# Patient Record
Sex: FEMALE | URBAN - METROPOLITAN AREA
[De-identification: names, ages, dates, MRNs, and addresses within clinical notes are randomized per-mention and may not be internally consistent; named-entity substitution may affect disease eponyms.]

---

## 2023-08-02 ENCOUNTER — TELEPHONE (OUTPATIENT)
Age: 38
End: 2023-08-02

## 2023-08-02 ENCOUNTER — NURSE ONLY (OUTPATIENT)
Age: 38
End: 2023-08-02

## 2023-08-02 NOTE — TELEPHONE ENCOUNTER
The [facility]  was used. Discussed colpo with pt. See notes in the [address] nurse visit for 8/2/23.

## 2023-09-06 ENCOUNTER — HOSPITAL ENCOUNTER (OUTPATIENT)
Facility: HOSPITAL | Age: 38
Setting detail: SPECIMEN
Discharge: HOME | End: 2023-09-09

## 2023-09-06 ENCOUNTER — OFFICE VISIT (OUTPATIENT)
Age: 38
End: 2023-09-06

## 2023-09-06 VITALS — DIASTOLIC BLOOD PRESSURE: 70 MMHG | WEIGHT: 132 LBS | SYSTOLIC BLOOD PRESSURE: 116 MMHG

## 2023-09-06 DIAGNOSIS — R87.613 HGSIL ON CYTOLOGIC SMEAR OF CERVIX: Primary | ICD-10-CM

## 2023-09-06 LAB
HCG, PREGNANCY, URINE, POC: NEGATIVE
VALID INTERNAL CONTROL, POC: YES

## 2023-09-06 RX ORDER — ETONOGESTREL 68 MG/1
68 IMPLANT SUBCUTANEOUS ONCE
COMMUNITY

## 2023-09-06 NOTE — PROGRESS NOTES
Abnormal Pap Problem Visit    Talita Hurtado is a 41 y.o.  presenting for problem visit for discussion and management of an abnormal pap test.  She has a history of regular cervical cancer screening. She declines ever having an abnormal pap test before. She is a lifetime non-smoker. She has had 2 previous vaginal deliveries, is done with childbearing. She currently has a Nexplanon in for birth control (placed in 2023). She has irregular bleeding with her Nexplanon in.    Ob/Gyn Hx:    - 2 vaginal deliveries. LMP- absent in flow due to delivery. Menses- see above. Contraception- Nexplanon  SA- yes- male. Past Medical History:   Diagnosis Date    Abnormal Pap smear of cervix     HGSIL       History reviewed. No pertinent surgical history. No family history on file. Social History     Socioeconomic History    Marital status: Unknown     Spouse name: Not on file    Number of children: Not on file    Years of education: Not on file    Highest education level: Not on file   Occupational History    Not on file   Tobacco Use    Smoking status: Never    Smokeless tobacco: Never   Vaping Use    Vaping Use: Never used   Substance and Sexual Activity    Alcohol use: Never    Drug use: Never    Sexual activity: Yes     Partners: Male     Birth control/protection: Implant   Other Topics Concern    Not on file   Social History Narrative    Not on file     Social Determinants of Health     Financial Resource Strain: Not on file   Food Insecurity: Not on file   Transportation Needs: Not on file   Physical Activity: Not on file   Stress: Not on file   Social Connections: Not on file   Intimate Partner Violence: Not on file   Housing Stability: Not on file       Current Outpatient Medications   Medication Sig Dispense Refill    etonogestrel (NEXPLANON) 68 MG implant 68 mg by Subdermal route once       No current facility-administered medications for this visit.        No Known Allergies    Review of

## 2023-09-12 ENCOUNTER — TELEPHONE (OUTPATIENT)
Age: 38
End: 2023-09-12

## 2023-09-12 NOTE — TELEPHONE ENCOUNTER
Called Brittney to review results of recent colposcopy. Given findings of CIN3 on 6:00 biopsy, recommend LEEP procedure. Discussed recommendation with patient. She is in agreement. Will schedule for outpatient LEEP. Discussed recommendation for tylenol/motrin after procedure and abstaining from intercourse for at least 2 weeks after procedure to give cervix time to heal.    We discussed the risks of surgery including the risks of bleeding, infection, deep vein thrombosis, and surgical injuries to internal organs including but not limited to the cervix and vagina. The patient understands the risks; any and all questions were answered to the patient's satisfaction.       Casa Guzman MD

## 2023-09-15 ENCOUNTER — TELEPHONE (OUTPATIENT)
Age: 38
End: 2023-09-15

## 2023-09-15 NOTE — TELEPHONE ENCOUNTER
Calling patient to follow up post colposcopy. Patient will need LEEP and has been scheduled for 10/20/2023. Needs to discuss [address]. Patient did not answer, left message to call back. Christiane Mcgill RN      Colposcopy progress notes, pathology report sent to referring provider as per continuity of care via [employer] fax/routing.  Christiane Mcgill RN

## 2023-09-20 ENCOUNTER — PREP FOR PROCEDURE (OUTPATIENT)
Facility: HOSPITAL | Age: 38
End: 2023-09-20

## 2023-09-20 DIAGNOSIS — D06.9 CIN III (CERVICAL INTRAEPITHELIAL NEOPLASIA III): Primary | ICD-10-CM

## 2023-09-27 ENCOUNTER — TELEPHONE (OUTPATIENT)
Age: 38
End: 2023-09-27

## 2023-09-27 NOTE — TELEPHONE ENCOUNTER
Returned patient's phone call no answer. Left message that I will try to call back tomorrow. I will be contacting [employer] and have them route bill to our office it usually takes longer than >30 days to process.

## 2023-09-27 NOTE — TELEPHONE ENCOUNTER
Patient receive a bill for $372.20 for a colposcopy that was done on 9/6/23 with [facility] was told it will be at no charge.      Phone

## 2023-10-18 ENCOUNTER — ANESTHESIA EVENT (OUTPATIENT)
Facility: HOSPITAL | Age: 38
End: 2023-10-18

## 2023-10-19 RX ORDER — PROCHLORPERAZINE EDISYLATE 5 MG/ML
5 INJECTION INTRAMUSCULAR; INTRAVENOUS
Status: CANCELLED | OUTPATIENT
Start: 2023-10-19 | End: 2023-10-20

## 2023-10-19 RX ORDER — SODIUM CHLORIDE 0.9 % (FLUSH) 0.9 %
5-40 SYRINGE (ML) INJECTION PRN
Status: CANCELLED | OUTPATIENT
Start: 2023-10-19

## 2023-10-19 RX ORDER — HYDROMORPHONE HYDROCHLORIDE 1 MG/ML
0.5 INJECTION, SOLUTION INTRAMUSCULAR; INTRAVENOUS; SUBCUTANEOUS EVERY 5 MIN PRN
Status: CANCELLED | OUTPATIENT
Start: 2023-10-19

## 2023-10-19 RX ORDER — OXYCODONE HYDROCHLORIDE 5 MG/1
5 TABLET ORAL
Status: CANCELLED | OUTPATIENT
Start: 2023-10-19 | End: 2023-10-20

## 2023-10-19 RX ORDER — SODIUM CHLORIDE 0.9 % (FLUSH) 0.9 %
5-40 SYRINGE (ML) INJECTION EVERY 12 HOURS SCHEDULED
Status: CANCELLED | OUTPATIENT
Start: 2023-10-19

## 2023-10-19 RX ORDER — ONDANSETRON 2 MG/ML
4 INJECTION INTRAMUSCULAR; INTRAVENOUS
Status: CANCELLED | OUTPATIENT
Start: 2023-10-19 | End: 2023-10-20

## 2023-10-19 RX ORDER — FENTANYL CITRATE 50 UG/ML
25 INJECTION, SOLUTION INTRAMUSCULAR; INTRAVENOUS EVERY 5 MIN PRN
Status: CANCELLED | OUTPATIENT
Start: 2023-10-19

## 2023-10-19 RX ORDER — HYDRALAZINE HYDROCHLORIDE 20 MG/ML
10 INJECTION INTRAMUSCULAR; INTRAVENOUS
Status: CANCELLED | OUTPATIENT
Start: 2023-10-19

## 2023-10-19 RX ORDER — SODIUM CHLORIDE 9 MG/ML
INJECTION, SOLUTION INTRAVENOUS PRN
Status: CANCELLED | OUTPATIENT
Start: 2023-10-19

## 2023-10-20 ENCOUNTER — ANESTHESIA (OUTPATIENT)
Facility: HOSPITAL | Age: 38
End: 2023-10-20

## 2023-10-20 ENCOUNTER — TELEPHONE (OUTPATIENT)
Age: 38
End: 2023-10-20

## 2023-10-20 ENCOUNTER — HOSPITAL ENCOUNTER (OUTPATIENT)
Facility: HOSPITAL | Age: 38
Setting detail: OUTPATIENT SURGERY
Discharge: HOME | End: 2023-10-20
Attending: OBSTETRICS & GYNECOLOGY | Admitting: OBSTETRICS & GYNECOLOGY

## 2023-10-20 VITALS
SYSTOLIC BLOOD PRESSURE: 126 MMHG | RESPIRATION RATE: 16 BRPM | WEIGHT: 132.06 LBS | OXYGEN SATURATION: 100 % | BODY MASS INDEX: 22.55 KG/M2 | TEMPERATURE: 98.4 F | HEIGHT: 64 IN | DIASTOLIC BLOOD PRESSURE: 79 MMHG | HEART RATE: 58 BPM

## 2023-10-20 DIAGNOSIS — D06.9 CIN III (CERVICAL INTRAEPITHELIAL NEOPLASIA III): ICD-10-CM

## 2023-10-20 LAB — HCG UR QL: NEGATIVE

## 2023-10-20 PROCEDURE — 81025 URINE PREGNANCY TEST: CPT

## 2023-10-20 PROCEDURE — 3600000003 HC SURGERY LEVEL 3 BASE: Performed by: OBSTETRICS & GYNECOLOGY

## 2023-10-20 PROCEDURE — 2500000003 HC RX 250 WO HCPCS: Performed by: OBSTETRICS & GYNECOLOGY

## 2023-10-20 PROCEDURE — 6360000002 HC RX W HCPCS: Performed by: NURSE ANESTHETIST, CERTIFIED REGISTERED

## 2023-10-20 PROCEDURE — 7100000001 HC PACU RECOVERY - ADDTL 15 MIN: Performed by: OBSTETRICS & GYNECOLOGY

## 2023-10-20 PROCEDURE — 7100000011 HC PHASE II RECOVERY - ADDTL 15 MIN: Performed by: OBSTETRICS & GYNECOLOGY

## 2023-10-20 PROCEDURE — 3700000000 HC ANESTHESIA ATTENDED CARE: Performed by: OBSTETRICS & GYNECOLOGY

## 2023-10-20 PROCEDURE — 2500000003 HC RX 250 WO HCPCS: Performed by: NURSE ANESTHETIST, CERTIFIED REGISTERED

## 2023-10-20 PROCEDURE — 7100000010 HC PHASE II RECOVERY - FIRST 15 MIN: Performed by: OBSTETRICS & GYNECOLOGY

## 2023-10-20 PROCEDURE — 3700000001 HC ADD 15 MINUTES (ANESTHESIA): Performed by: OBSTETRICS & GYNECOLOGY

## 2023-10-20 PROCEDURE — 7100000000 HC PACU RECOVERY - FIRST 15 MIN: Performed by: OBSTETRICS & GYNECOLOGY

## 2023-10-20 PROCEDURE — 2709999900 HC NON-CHARGEABLE SUPPLY: Performed by: OBSTETRICS & GYNECOLOGY

## 2023-10-20 PROCEDURE — 6370000000 HC RX 637 (ALT 250 FOR IP): Performed by: OBSTETRICS & GYNECOLOGY

## 2023-10-20 PROCEDURE — 2580000003 HC RX 258: Performed by: NURSE ANESTHETIST, CERTIFIED REGISTERED

## 2023-10-20 PROCEDURE — 3600000013 HC SURGERY LEVEL 3 ADDTL 15MIN: Performed by: OBSTETRICS & GYNECOLOGY

## 2023-10-20 PROCEDURE — 88307 TISSUE EXAM BY PATHOLOGIST: CPT

## 2023-10-20 PROCEDURE — 57522 CONIZATION OF CERVIX: CPT | Performed by: OBSTETRICS & GYNECOLOGY

## 2023-10-20 RX ORDER — SODIUM CHLORIDE 0.9 % (FLUSH) 0.9 %
5-40 SYRINGE (ML) INJECTION PRN
Status: DISCONTINUED | OUTPATIENT
Start: 2023-10-20 | End: 2023-10-20 | Stop reason: HOSPADM

## 2023-10-20 RX ORDER — KETAMINE HCL IN NACL, ISO-OSM 100MG/10ML
SYRINGE (ML) INJECTION PRN
Status: DISCONTINUED | OUTPATIENT
Start: 2023-10-20 | End: 2023-10-20 | Stop reason: SDUPTHER

## 2023-10-20 RX ORDER — ONDANSETRON 2 MG/ML
INJECTION INTRAMUSCULAR; INTRAVENOUS PRN
Status: DISCONTINUED | OUTPATIENT
Start: 2023-10-20 | End: 2023-10-20 | Stop reason: SDUPTHER

## 2023-10-20 RX ORDER — SODIUM CHLORIDE, SODIUM LACTATE, POTASSIUM CHLORIDE, CALCIUM CHLORIDE 600; 310; 30; 20 MG/100ML; MG/100ML; MG/100ML; MG/100ML
INJECTION, SOLUTION INTRAVENOUS CONTINUOUS PRN
Status: DISCONTINUED | OUTPATIENT
Start: 2023-10-20 | End: 2023-10-20 | Stop reason: SDUPTHER

## 2023-10-20 RX ORDER — LUGOLS 0.4 G/G
LIQUID TOPICAL PRN
Status: DISCONTINUED | OUTPATIENT
Start: 2023-10-20 | End: 2023-10-20 | Stop reason: HOSPADM

## 2023-10-20 RX ORDER — FENTANYL CITRATE 50 UG/ML
INJECTION, SOLUTION INTRAMUSCULAR; INTRAVENOUS PRN
Status: DISCONTINUED | OUTPATIENT
Start: 2023-10-20 | End: 2023-10-20 | Stop reason: SDUPTHER

## 2023-10-20 RX ORDER — MIDAZOLAM HYDROCHLORIDE 1 MG/ML
INJECTION INTRAMUSCULAR; INTRAVENOUS PRN
Status: DISCONTINUED | OUTPATIENT
Start: 2023-10-20 | End: 2023-10-20 | Stop reason: SDUPTHER

## 2023-10-20 RX ORDER — DEXAMETHASONE SODIUM PHOSPHATE 4 MG/ML
INJECTION, SOLUTION INTRA-ARTICULAR; INTRALESIONAL; INTRAMUSCULAR; INTRAVENOUS; SOFT TISSUE PRN
Status: DISCONTINUED | OUTPATIENT
Start: 2023-10-20 | End: 2023-10-20 | Stop reason: SDUPTHER

## 2023-10-20 RX ORDER — FERRIC SUBSULFATE 20-22G/100
SOLUTION, NON-ORAL MISCELLANEOUS PRN
Status: DISCONTINUED | OUTPATIENT
Start: 2023-10-20 | End: 2023-10-20 | Stop reason: HOSPADM

## 2023-10-20 RX ORDER — ACETIC ACID 5 %
LIQUID (ML) MISCELLANEOUS PRN
Status: DISCONTINUED | OUTPATIENT
Start: 2023-10-20 | End: 2023-10-20 | Stop reason: HOSPADM

## 2023-10-20 RX ORDER — SODIUM CHLORIDE 0.9 % (FLUSH) 0.9 %
5-40 SYRINGE (ML) INJECTION EVERY 12 HOURS SCHEDULED
Status: DISCONTINUED | OUTPATIENT
Start: 2023-10-20 | End: 2023-10-20 | Stop reason: HOSPADM

## 2023-10-20 RX ORDER — FENTANYL CITRATE 50 UG/ML
100 INJECTION, SOLUTION INTRAMUSCULAR; INTRAVENOUS
Status: DISCONTINUED | OUTPATIENT
Start: 2023-10-20 | End: 2023-10-20 | Stop reason: HOSPADM

## 2023-10-20 RX ORDER — ACETAMINOPHEN 500 MG
1000 TABLET ORAL ONCE
Status: DISCONTINUED | OUTPATIENT
Start: 2023-10-20 | End: 2023-10-20 | Stop reason: HOSPADM

## 2023-10-20 RX ORDER — SODIUM CHLORIDE 9 MG/ML
INJECTION, SOLUTION INTRAVENOUS PRN
Status: DISCONTINUED | OUTPATIENT
Start: 2023-10-20 | End: 2023-10-20 | Stop reason: HOSPADM

## 2023-10-20 RX ORDER — PROPOFOL 10 MG/ML
INJECTION, EMULSION INTRAVENOUS CONTINUOUS PRN
Status: DISCONTINUED | OUTPATIENT
Start: 2023-10-20 | End: 2023-10-20 | Stop reason: SDUPTHER

## 2023-10-20 RX ORDER — LIDOCAINE HYDROCHLORIDE AND EPINEPHRINE 10; 10 MG/ML; UG/ML
INJECTION, SOLUTION INFILTRATION; PERINEURAL PRN
Status: DISCONTINUED | OUTPATIENT
Start: 2023-10-20 | End: 2023-10-20 | Stop reason: HOSPADM

## 2023-10-20 RX ORDER — KETOROLAC TROMETHAMINE 30 MG/ML
INJECTION, SOLUTION INTRAMUSCULAR; INTRAVENOUS PRN
Status: DISCONTINUED | OUTPATIENT
Start: 2023-10-20 | End: 2023-10-20 | Stop reason: SDUPTHER

## 2023-10-20 RX ORDER — SODIUM CHLORIDE, SODIUM LACTATE, POTASSIUM CHLORIDE, CALCIUM CHLORIDE 600; 310; 30; 20 MG/100ML; MG/100ML; MG/100ML; MG/100ML
INJECTION, SOLUTION INTRAVENOUS CONTINUOUS
Status: DISCONTINUED | OUTPATIENT
Start: 2023-10-20 | End: 2023-10-20 | Stop reason: HOSPADM

## 2023-10-20 RX ORDER — LIDOCAINE HYDROCHLORIDE 10 MG/ML
1 INJECTION, SOLUTION EPIDURAL; INFILTRATION; INTRACAUDAL; PERINEURAL
Status: DISCONTINUED | OUTPATIENT
Start: 2023-10-20 | End: 2023-10-20 | Stop reason: HOSPADM

## 2023-10-20 RX ORDER — MIDAZOLAM HYDROCHLORIDE 2 MG/2ML
2 INJECTION, SOLUTION INTRAMUSCULAR; INTRAVENOUS
Status: DISCONTINUED | OUTPATIENT
Start: 2023-10-20 | End: 2023-10-20 | Stop reason: HOSPADM

## 2023-10-20 RX ADMIN — MIDAZOLAM HYDROCHLORIDE 3 MG: 1 INJECTION, SOLUTION INTRAMUSCULAR; INTRAVENOUS at 13:05

## 2023-10-20 RX ADMIN — DEXAMETHASONE SODIUM PHOSPHATE 4 MG: 4 INJECTION INTRA-ARTICULAR; INTRALESIONAL; INTRAMUSCULAR; INTRAVENOUS; SOFT TISSUE at 13:29

## 2023-10-20 RX ADMIN — PROPOFOL 125 MCG/KG/MIN: 10 INJECTION, EMULSION INTRAVENOUS at 13:15

## 2023-10-20 RX ADMIN — SODIUM CHLORIDE, POTASSIUM CHLORIDE, SODIUM LACTATE AND CALCIUM CHLORIDE: 600; 310; 30; 20 INJECTION, SOLUTION INTRAVENOUS at 12:50

## 2023-10-20 RX ADMIN — ONDANSETRON 4 MG: 2 INJECTION INTRAMUSCULAR; INTRAVENOUS at 13:29

## 2023-10-20 RX ADMIN — MIDAZOLAM HYDROCHLORIDE 2 MG: 1 INJECTION, SOLUTION INTRAMUSCULAR; INTRAVENOUS at 13:10

## 2023-10-20 RX ADMIN — FENTANYL CITRATE 25 MCG: 50 INJECTION, SOLUTION INTRAMUSCULAR; INTRAVENOUS at 13:32

## 2023-10-20 RX ADMIN — KETOROLAC TROMETHAMINE 30 MG: 30 INJECTION, SOLUTION INTRAMUSCULAR; INTRAVENOUS at 13:46

## 2023-10-20 RX ADMIN — Medication 25 MG: at 13:19

## 2023-10-20 ASSESSMENT — PAIN - FUNCTIONAL ASSESSMENT: PAIN_FUNCTIONAL_ASSESSMENT: NONE - DENIES PAIN

## 2023-10-20 ASSESSMENT — PAIN SCALES - GENERAL
PAINLEVEL_OUTOF10: 0
PAINLEVEL_OUTOF10: 0

## 2023-10-20 NOTE — FLOWSHEET NOTE
10/20/23 1357   Handoff   Communication Given Transfer Handoff   Handoff Given To PACU   Handoff Received From Carlo Rucker RN   Handoff Communication Face to Face   End of Shift Check Performed N/A

## 2023-10-20 NOTE — TELEPHONE ENCOUNTER
services use to conduct telephone call. Patient is checked in on Dr. Ena Hutchins schedule for a LEEP?  states patient is here in office. Per JAH Arreguin having trouble locating patient in office. Left message to call office.

## 2023-10-20 NOTE — DISCHARGE INSTRUCTIONS
After Care Instructions For Leep Cone Biopsy      1. Typically following this procedure, there is minimal pain. However, you may experience some dull crampy lower abdominal discomfort which can be relieved by Tylenol or Motrin. If severe pain develops, notify the office at [phone]. 2. It is normal to experience some spotting or even light bleeding. This discharge may occur for several days following the procedure. If bleeding exceeds soaking a pad every 2 hours or passing blood clots, you should contact the office. 3. Avoid placing anything in your vagina. Do not douche or use tampons. Sleeping [place] may be uncomfortable and may create bleeding and/or infection. These restrictions will be lifted after your physician has seen you on your post-op visit. 4. You may take showers. Avoid using a tub bath, swimming pool or hot tub until after your check-up. 5. Please notify the office if you have a fever which is a temperature above 100.4. You should also notify the office if you develop severe abdominal pain, heavy vaginal bleeding or a foul smelling vaginal discharge. 6. It is difficult to predict when your next menstrual period will occur as your body has undergone a major stress. Your period should regulate itself within the first 6 weeks post-op. 7. Please do not do strenuous exercise for the first 2 weeks following the procedure. You can then resume all usual activity. Your follow-up appointment should be in 4-6 weeks. Please contact the office at  [phone] if an appointment has not already been set up. Your first Pap smear post-operatively will be in 3-6 months. Please have a thorough discussion with the doctor about how often your follow-up needs to be, depending upon the disease we have treated you for.

## 2023-10-20 NOTE — OP NOTE
Operative Note      Patient: Ck Trinidad  YOB: 1985  MRN: [id number]    Date of Procedure: 10/20/2023    Pre-Op Diagnosis Codes:     * KOKO III (cervical intraepithelial neoplasia III) [D06.9]    Post-Op Diagnosis: Same       Procedure(s):  LOOP ELECTROSURGICAL EXCISION PROCEDURE    Surgeon(s):  Casa Guzman MD    Assistant:   * No surgical staff found *    Anesthesia: Monitor Anesthesia Care    Estimated Blood Loss (mL): Minimal    Complications: None    Specimens:   ID Type Source Tests Collected by Time Destination   1 : cervical leep biopsy. stitch at 12 o'clock Tissue Cervix SURGICAL PATHOLOGY Casa Guzman MD 10/20/2023 1343        Implants:  * No implants in log *      Drains: * No LDAs found *    Detailed Description of Procedure: Indications for procedure  Ck Trinidad is a 41 y.o. [id number] with CIN3/HSIL on biopsy. Given these results, options were reviewed and she was taken to the operating room for a LEEP procedure. Findings    Exam under anesthesia revealed a small, mobile, anteverted uterus. There were no adnexal masses detected. The cervix appeared pink and smooth, and no lesions or masses were noted on cervical exam.     Procedure Details   After a timeout correctly identified the patient, the procedure, and the members of the operative team, anesthesia was administered. She was placed in the lithotomy position using candycane stirrups. The patient was then prepped and draped in the usual sterile fashion. A coated speculum was placed in the vagina. Colposcopic examination was not performed due to no working colposcope in the OR. The cervix was then infiltrated with approximately 10cc  of 1% lidocaine with epinephrine in a circumferential fashion. After adequate blanching, Lugol stain was then applied to the cervix for confirmation of the lesion. Sidewall retractors were used to more appropriately visualize the cervix and protect the vaginal walls.  A 10-millimeter

## 2023-10-20 NOTE — DISCHARGE SUMMARY
Gynecology Discharge Summary     Patient ID:  Noe Kim  [id number]  46 y.o.  1985    Admit date: 10/20/2023    Discharge date: 10/20/2023     Admission Diagnoses: There is no problem list on file for this patient. Discharge Diagnoses: No discharge information exists for this patient. There is no problem list on file for this patient. Procedures for this admission: Procedure(s):  [facility] Course:    Disposition: Home or self care    Discharged Condition: stable            Patient Instructions:   Current Discharge Medication List        CONTINUE these medications which have NOT CHANGED    Details   etonogestrel (NEXPLANON) 68 MG implant 68 mg by Subdermal route once           Activity: activity as tolerated  Diet: regular diet  Wound Care: keep wound clean and dry    Follow-up with  in 6 weeks.     Signed:  Monique Stone MD  10/20/2023  1:57 PM

## 2023-10-20 NOTE — H&P
Gynecology History and Physical    Name: Emmy Billingsley MRN: [id number] SSN: [id number]    YOB: 1985  Age: 41 y.o. Sex: female       Subjective:      Chief complaint:  Cervical dysplasia    Alison Mckinley is a 41 y.o.  female with a history of HSIL pap and HSIL on colposcopy- biopsy at 6:00. She has no history of prior abnormal pap tests. She is admitted for Procedure(s) (LRB):  LOOP ELECTROSURGICAL EXCISION PROCEDURE, COLPOSCOPY (N/A). The current method of family planning is Nexplanon. OB History          2    Para   2    Term   2       0    AB   0    Living   2         SAB   0    IAB   0    Ectopic        Molar        Multiple        Live Births              Obstetric Comments   2 vaginal deliveries. One boy and one girl. Past Medical History:   Diagnosis Date    Abnormal Pap smear of cervix     HGSIL     No past surgical history on file. Social History     Occupational History    Not on file   Tobacco Use    Smoking status: Never    Smokeless tobacco: Never   Vaping Use    Vaping Use: Never used   Substance and Sexual Activity    Alcohol use: Never    Drug use: Never    Sexual activity: Yes     Partners: Male     Birth control/protection: Implant     No family history on file. No Known Allergies  Prior to Admission medications    Medication Sig Start Date End Date Taking? Authorizing Provider   etonogestrel (NEXPLANON) 68 MG implant 68 mg by Subdermal route once    Provider, MD Yobany        Review of Systems:  A comprehensive review of systems was negative except for that written in the History of Present Illness. Objective: There were no vitals filed for this visit. Physical Exam:  Deferred    Assessment:     Cervical dysplasia with colposcopy demonstrating    1. Cervix, 6:00, biopsy: High-grade squamous intraepithelial lesion (KOKO 3)     2. Cervix, 12:00, biopsy: Mild chronic cervicitis. No dysplasia identified.  Transformation

## 2023-10-20 NOTE — PERIOP NOTE
I have reviewed discharge instructions with the  spouse-Jose. The  spouse-Jose verbalized understanding. All questions addressed at this time. A paper copy of these instructions have been given to the patient to take home.

## 2023-10-24 ENCOUNTER — TELEPHONE (OUTPATIENT)
Age: 38
End: 2023-10-24

## 2023-10-24 NOTE — TELEPHONE ENCOUNTER
Called patient to notify results of LEEP biopsy. LEEP showed HSIL with negative margins. Recommend repeat pap test in 1 year.     Hany Guthrie MD

## 2023-10-26 NOTE — TELEPHONE ENCOUNTER
Called patient, ID x2- Patient does not meet qualification for [facility] Medicaid due to no SS#. Pt had LEEP on 10/20/2023- she will be applying for [employer]. Application started and patient will get documents needed to attach to application and will meet with this nurse at [facility] on 11/16/2023 to drop off completed application at [facility] financial counselor drop box. Patient thankful for assistance. Kaylen Holguin RN      Patient was aware of LEEP results and recommendations, discussed importance of getting her pap smear in 1 year and she will follow up  with the [facility] to get this schedule. she is aware that [employer]   Only covered her colposcopy and biopsy services- pt has received bills for this but discussed that she is not responsible for them. I have submitted an inquiry to transfer charges to [facility] on 10/26/2023.

## 2023-11-01 ENCOUNTER — NURSE ONLY (OUTPATIENT)
Age: 38
End: 2023-11-01

## 2023-11-01 NOTE — PROGRESS NOTES
Encounter completed in re: finalizing patient's referral in to the [facility] Every [address] program.   Patient referred to us for colposcopy by the [facility]. Pt enrolled in [facility] [employer] program and was sent to have colposcopy on 9/6/2023- resulted HSIL. The provider was routed the office note and pathology  report  and pt was told of the results.  Patient has been billed in [facility], RN Respiratory failure with hypercapnia, unspecified chronicity  01/29/2017    Active  Arturo Uribe Respiratory failure with hypercapnia, unspecified chronicity  01/29/2017    Active  Arturo Uribe Respiratory failure with hypercapnia, unspecified chronicity  01/29/2017    Active  Arturo Uribe

## 2023-11-16 ENCOUNTER — NURSE ONLY (OUTPATIENT)
Age: 38
End: 2023-11-16

## 2023-12-04 NOTE — PROGRESS NOTES
Met in person with patient and drop off completed [employer]. Provided patient a copy of submitted application and attached documents.  Patient aware that it can take up to 90 days to process Wilson Dimas RN

## 2024-01-04 ENCOUNTER — NURSE ONLY (OUTPATIENT)
Age: 39
End: 2024-01-04

## 2024-01-04 NOTE — PROGRESS NOTES
Patient applied for [employer] financial assistance on 11/16/2023 as was denied due to required additional documents. Patient met with me and provided additional documentation. Financial assistance applciation and supporting documents were sent to [facility] financial counselor Moose Thurman via encrypted email, patient is aware that she will get a letter with a decision in the mail . Ene Benjamin RN

## 2024-01-19 ENCOUNTER — TELEPHONE (OUTPATIENT)
Age: 39
End: 2024-01-19

## 2024-01-19 NOTE — TELEPHONE ENCOUNTER
Received call from patient ID x2 on 1/16/2024- that she had received a letter that her financial assistance was approved and they have received a letter. Instructed to make copies of approval letter and to send copy with bills from [facility]. Patient verbalized understanding. All questions answered. Ene Benjamin RN    01/19/24 noted that patient had made a copay on her alda. On 9/6/2023- this appt. Was covered by [facility]. Asking [employer] for a refund. Ene Benjamin RN

## 2024-06-11 ENCOUNTER — APPOINTMENT (OUTPATIENT)
Facility: HOSPITAL | Age: 39
End: 2024-06-11

## 2024-06-11 ENCOUNTER — HOSPITAL ENCOUNTER (EMERGENCY)
Facility: HOSPITAL | Age: 39
Discharge: HOME | End: 2024-06-11
Attending: SPECIALIST

## 2024-06-11 VITALS
OXYGEN SATURATION: 96 % | RESPIRATION RATE: 15 BRPM | TEMPERATURE: 98.7 F | DIASTOLIC BLOOD PRESSURE: 89 MMHG | HEART RATE: 66 BPM | BODY MASS INDEX: 28.13 KG/M2 | WEIGHT: 134 LBS | HEIGHT: 58 IN | SYSTOLIC BLOOD PRESSURE: 146 MMHG

## 2024-06-11 DIAGNOSIS — S16.1XXA ACUTE STRAIN OF NECK MUSCLE, INITIAL ENCOUNTER: ICD-10-CM

## 2024-06-11 DIAGNOSIS — R07.89 CHEST WALL TENDERNESS: ICD-10-CM

## 2024-06-11 DIAGNOSIS — M25.561 ACUTE PAIN OF RIGHT KNEE: ICD-10-CM

## 2024-06-11 DIAGNOSIS — M54.50 ACUTE BILATERAL LOW BACK PAIN WITHOUT SCIATICA: ICD-10-CM
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
## 2024-06-11 LAB
EKG ATRIAL RATE: 64 BPM
EKG DIAGNOSIS: NORMAL
EKG P AXIS: 70 DEGREES
EKG P-R INTERVAL: 124 MS
EKG Q-T INTERVAL: 410 MS
EKG QRS DURATION: 90 MS
EKG QTC CALCULATION (BAZETT): 422 MS
EKG R AXIS: 69 DEGREES
EKG T AXIS: 40 DEGREES
EKG VENTRICULAR RATE: 64 BPM

## 2024-06-11 PROCEDURE — 6370000000 HC RX 637 (ALT 250 FOR IP): Performed by: NURSE PRACTITIONER

## 2024-06-11 PROCEDURE — 93005 ELECTROCARDIOGRAM TRACING: CPT | Performed by: STUDENT IN AN ORGANIZED HEALTH CARE EDUCATION/TRAINING PROGRAM

## 2024-06-11 PROCEDURE — 72070 X-RAY EXAM THORAC SPINE 2VWS: CPT

## 2024-06-11 PROCEDURE — 73560 X-RAY EXAM OF KNEE 1 OR 2: CPT

## 2024-06-11 PROCEDURE — 71046 X-RAY EXAM CHEST 2 VIEWS: CPT

## 2024-06-11 PROCEDURE — 72110 X-RAY EXAM L-2 SPINE 4/>VWS: CPT

## 2024-06-11 PROCEDURE — 72050 X-RAY EXAM NECK SPINE 4/5VWS: CPT

## 2024-06-11 PROCEDURE — 93010 ELECTROCARDIOGRAM REPORT: CPT | Performed by: INTERNAL MEDICINE

## 2024-06-11 PROCEDURE — 99284 EMERGENCY DEPT VISIT MOD MDM: CPT

## 2024-06-11 RX ORDER — IBUPROFEN 400 MG/1
800 TABLET ORAL
Status: COMPLETED | OUTPATIENT
Start: 2024-06-11 | End: 2024-06-11

## 2024-06-11 RX ORDER — NAPROXEN 500 MG/1
500 TABLET ORAL 2 TIMES DAILY
Qty: 60 TABLET | Refills: 0 | Status: SHIPPED | OUTPATIENT
Start: 2024-06-11

## 2024-06-11 RX ORDER — LIDOCAINE 4 G/G
1 PATCH TOPICAL
Status: DISCONTINUED | OUTPATIENT
Start: 2024-06-11 | End: 2024-06-11 | Stop reason: HOSPADM

## 2024-06-11 RX ORDER — METHOCARBAMOL 750 MG/1
750 TABLET, FILM COATED ORAL 4 TIMES DAILY
Qty: 40 TABLET | Refills: 0 | Status: SHIPPED | OUTPATIENT
Start: 2024-06-11 | End: 2024-06-21

## 2024-06-11 RX ORDER — LIDOCAINE 50 MG/G
1 PATCH TOPICAL DAILY
Qty: 10 PATCH | Refills: 0 | Status: SHIPPED | OUTPATIENT
Start: 2024-06-11 | End: 2024-06-21

## 2024-06-11 RX ORDER — METHOCARBAMOL 500 MG/1
750 TABLET, FILM COATED ORAL
Status: COMPLETED | OUTPATIENT
Start: 2024-06-11 | End: 2024-06-11

## 2024-06-11 RX ORDER — LIDOCAINE 4 G/G
1 PATCH TOPICAL
Status: DISCONTINUED | OUTPATIENT
Start: 2024-06-11 | End: 2024-06-11

## 2024-06-11 RX ADMIN — METHOCARBAMOL TABLETS 750 MG: 500 TABLET, COATED ORAL at 13:32

## 2024-06-11 RX ADMIN — IBUPROFEN 800 MG: 400 TABLET, FILM COATED ORAL at 13:33

## 2024-06-11 ASSESSMENT — PAIN - FUNCTIONAL ASSESSMENT
PAIN_FUNCTIONAL_ASSESSMENT: PREVENTS OR INTERFERES SOME ACTIVE ACTIVITIES AND ADLS
PAIN_FUNCTIONAL_ASSESSMENT: 0-10

## 2024-06-11 ASSESSMENT — PAIN DESCRIPTION - DESCRIPTORS: DESCRIPTORS: ACHING

## 2024-06-11 ASSESSMENT — PAIN DESCRIPTION - ONSET: ONSET: SUDDEN

## 2024-06-11 ASSESSMENT — PAIN DESCRIPTION - FREQUENCY: FREQUENCY: CONTINUOUS

## 2024-06-11 ASSESSMENT — PAIN DESCRIPTION - ORIENTATION: ORIENTATION: RIGHT

## 2024-06-11 ASSESSMENT — PAIN SCALES - GENERAL: PAINLEVEL_OUTOF10: 7

## 2024-06-11 ASSESSMENT — HEART SCORE: ECG: NORMAL

## 2024-06-11 ASSESSMENT — PAIN DESCRIPTION - LOCATION: LOCATION: BACK;FLANK;KNEE

## 2024-06-11 ASSESSMENT — PAIN DESCRIPTION - PAIN TYPE: TYPE: ACUTE PAIN

## 2024-06-11 NOTE — DISCHARGE INSTRUCTIONS
[facility] Health Departments     For adult and child immunizations, family planning, TB screening, STD testing and women's health services.   [facility]: [facility] [phone]      [facility] [phone]    [facility]   [phone]    [facility]   [phone]    [place]   [phone]    [facility]: [place] [phone]      [place] [phone]      [place] [phone]          General Health Clinics     For primary care services, woman and child wellness, and some clinics providing specialty care.   [facility] [address] [facility] [address] [facility] [phone]   [facility] [address] [phone]   [facility] [address] [phone]   [facility] [address] [phone]   [facility] [address] [phone]   [facility] [address] [phone]   [facility] [address] [facility] [phone]   [facility]: [facility] [address] [phone], [phone]     [place] [address] [phone]     [place] [address] [facility] [address] [phone]   [place] [address] [phone]   [employer]  [address] [phone]   [employer] [facility] (www.[employer]/about/mission.asp) [phone]-KYWA         Sexual Health/Woman Wellness Clinics    For STD/HIV testing and treatment, pregnancy testing and services, men's health, birth control services, LGBT services, and hepatitis/HPV vaccine services.   [place] [facility] [address] [phone]   [facility] [address] [phone]   [facility] [address] [phone]   [facility] [address], 5th floor [phone]   [facility] [address] [phone]   [facility] [address] [phone]         Specialty Service
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
## 2024-11-19 ENCOUNTER — CLINICAL DOCUMENTATION (OUTPATIENT)
Age: 39
End: 2024-11-19

## 2024-11-19 NOTE — PROGRESS NOTES
[facility] April Lewis RN to send pathology report from [facility] 10/2023 for continuity of care- pathology report sent as requested following policy and protocol. Ene Benjamin RN

## (undated) DEVICE — GOWN,SIRUS,FABRNF,XL,20/CS: Brand: MEDLINE

## (undated) DEVICE — GYN LITHOTOMY: Brand: MEDLINE INDUSTRIES, INC.

## (undated) DEVICE — ELECTRODE ES L11CM DIA5MM BALL SHFT RED DISP UTAHLOOP

## (undated) DEVICE — ELECTRODE ES L11CM L15XW20MM BLU SAFE-T-GAUGE LOOP RND

## (undated) DEVICE — GLOVE SURG SZ 65 L12IN FNGR THK79MIL GRN LTX FREE

## (undated) DEVICE — YANKAUER,TAPERED BULBOUS TIP,W/O VENT: Brand: MEDLINE

## (undated) DEVICE — SWAB MEDICATED 8 IN PROCTO

## (undated) DEVICE — PENCIL ES CRD L10FT HND SWCHING ROCK SWCH W/ EDGE COAT BLDE

## (undated) DEVICE — GLOVE SURG SZ 6 THK91MIL LTX FREE SYN POLYISOPRENE ANTI

## (undated) DEVICE — TUBING, SUCTION, 1/4" X 10', STRAIGHT: Brand: MEDLINE

## (undated) DEVICE — CATHETER,URETHRAL,REDRUBBER,STRL,14FR: Brand: MEDLINE INDUSTRIES, INC.

## (undated) DEVICE — SUTURE PERMA-HAND SZ 3-0 L18IN NONABSORBABLE BLK L24MM PS-1 1684G

## (undated) DEVICE — INTENT OT USE PROVIDES A STERILE INTERFACE BETWEEN THE OPERATING ROOM SURGICAL LAMPS (NON-STERILE) AND THE SURGEON OR STAFF WORKING IN THE STERILE FIELD.: Brand: ASPEN® ALC PLUS LIGHT HANDLE COVER

## (undated) DEVICE — ELECTRODE PT RET AD L9FT HI MOIST COND ADH HYDRGEL CORDED